# Patient Record
Sex: MALE | Race: BLACK OR AFRICAN AMERICAN | Employment: FULL TIME | URBAN - METROPOLITAN AREA
[De-identification: names, ages, dates, MRNs, and addresses within clinical notes are randomized per-mention and may not be internally consistent; named-entity substitution may affect disease eponyms.]

---

## 2020-06-14 ENCOUNTER — HOSPITAL ENCOUNTER (EMERGENCY)
Age: 35
Discharge: STILL ADMITTED OR ADMINISTRATIVE | End: 2020-06-14
Attending: EMERGENCY MEDICINE
Payer: SELF-PAY

## 2020-06-14 DIAGNOSIS — Z53.20 REFUSES TREATMENT: Primary | ICD-10-CM

## 2020-06-14 PROCEDURE — 75810000275 HC EMERGENCY DEPT VISIT NO LEVEL OF CARE

## 2020-06-14 NOTE — ED NOTES
Patient brought in by rescue and police for being aggressive and combative, patient refuses treatment, police want patient checked for drugs, explained to police that patient must be ECO because patient is refusing treatment.  Police refuses ECO, patient seen by Dr Iliana Thompson and patient refuses treatment, patient removed from ER by police after getting cleared

## 2020-06-15 NOTE — ED NOTES
Patient brought by EMS and [place] police for evaluation. He refused evaluation in the ED by this writer and was released in police custody.  requested that the patient be screen for drug and it was explained that he would need to be under an emergency custody order.